# Patient Record
Sex: FEMALE | Race: WHITE | NOT HISPANIC OR LATINO | Employment: OTHER | URBAN - METROPOLITAN AREA
[De-identification: names, ages, dates, MRNs, and addresses within clinical notes are randomized per-mention and may not be internally consistent; named-entity substitution may affect disease eponyms.]

---

## 2019-05-03 ENCOUNTER — PREP FOR PROCEDURE (OUTPATIENT)
Dept: SCHEDULING | Facility: MEDICAL CENTER | Age: 44
End: 2019-05-03

## 2019-05-03 PROBLEM — K42.0 UMBILICAL HERNIA WITH OBSTRUCTION BUT NO GANGRENE: Status: ACTIVE | Noted: 2019-05-03

## 2019-06-13 ENCOUNTER — HOSPITAL ENCOUNTER (OUTPATIENT)
Dept: RADIOLOGY | Facility: MEDICAL CENTER | Age: 44
End: 2019-06-13
Attending: OBSTETRICS & GYNECOLOGY | Admitting: SURGERY
Payer: COMMERCIAL

## 2019-06-13 DIAGNOSIS — Z01.812 PRE-OPERATIVE LABORATORY EXAMINATION: ICD-10-CM

## 2019-06-13 DIAGNOSIS — Z01.811 PRE-OPERATIVE RESPIRATORY EXAMINATION: ICD-10-CM

## 2019-06-13 LAB
ANION GAP SERPL CALC-SCNC: 7 MMOL/L (ref 0–11.9)
BASOPHILS # BLD AUTO: 0.4 % (ref 0–1.8)
BASOPHILS # BLD: 0.02 K/UL (ref 0–0.12)
BUN SERPL-MCNC: 16 MG/DL (ref 8–22)
CALCIUM SERPL-MCNC: 9.4 MG/DL (ref 8.5–10.5)
CHLORIDE SERPL-SCNC: 107 MMOL/L (ref 96–112)
CO2 SERPL-SCNC: 23 MMOL/L (ref 20–33)
CREAT SERPL-MCNC: 1.1 MG/DL (ref 0.5–1.4)
EOSINOPHIL # BLD AUTO: 0.07 K/UL (ref 0–0.51)
EOSINOPHIL NFR BLD: 1.4 % (ref 0–6.9)
ERYTHROCYTE [DISTWIDTH] IN BLOOD BY AUTOMATED COUNT: 42.5 FL (ref 35.9–50)
GLUCOSE SERPL-MCNC: 105 MG/DL (ref 65–99)
HCT VFR BLD AUTO: 44.5 % (ref 37–47)
HGB BLD-MCNC: 14.6 G/DL (ref 12–16)
IMM GRANULOCYTES # BLD AUTO: 0.01 K/UL (ref 0–0.11)
IMM GRANULOCYTES NFR BLD AUTO: 0.2 % (ref 0–0.9)
LYMPHOCYTES # BLD AUTO: 1.36 K/UL (ref 1–4.8)
LYMPHOCYTES NFR BLD: 26.9 % (ref 22–41)
MCH RBC QN AUTO: 31.7 PG (ref 27–33)
MCHC RBC AUTO-ENTMCNC: 32.8 G/DL (ref 33.6–35)
MCV RBC AUTO: 96.5 FL (ref 81.4–97.8)
MONOCYTES # BLD AUTO: 0.28 K/UL (ref 0–0.85)
MONOCYTES NFR BLD AUTO: 5.5 % (ref 0–13.4)
NEUTROPHILS # BLD AUTO: 3.32 K/UL (ref 2–7.15)
NEUTROPHILS NFR BLD: 65.6 % (ref 44–72)
NRBC # BLD AUTO: 0 K/UL
NRBC BLD-RTO: 0 /100 WBC
PLATELET # BLD AUTO: 206 K/UL (ref 164–446)
PMV BLD AUTO: 10.6 FL (ref 9–12.9)
POTASSIUM SERPL-SCNC: 4.5 MMOL/L (ref 3.6–5.5)
RBC # BLD AUTO: 4.61 M/UL (ref 4.2–5.4)
SODIUM SERPL-SCNC: 137 MMOL/L (ref 135–145)
WBC # BLD AUTO: 5.1 K/UL (ref 4.8–10.8)

## 2019-06-13 PROCEDURE — 85025 COMPLETE CBC W/AUTO DIFF WBC: CPT

## 2019-06-13 PROCEDURE — 36415 COLL VENOUS BLD VENIPUNCTURE: CPT

## 2019-06-13 PROCEDURE — 71046 X-RAY EXAM CHEST 2 VIEWS: CPT

## 2019-06-13 PROCEDURE — 80048 BASIC METABOLIC PNL TOTAL CA: CPT

## 2019-06-13 RX ORDER — IBUPROFEN 200 MG
600-800 TABLET ORAL EVERY 6 HOURS PRN
COMMUNITY

## 2019-06-20 ENCOUNTER — ANESTHESIA EVENT (OUTPATIENT)
Dept: SURGERY | Facility: MEDICAL CENTER | Age: 44
End: 2019-06-20
Payer: COMMERCIAL

## 2019-06-20 ENCOUNTER — HOSPITAL ENCOUNTER (OUTPATIENT)
Facility: MEDICAL CENTER | Age: 44
End: 2019-06-20
Attending: SURGERY | Admitting: SURGERY
Payer: COMMERCIAL

## 2019-06-20 ENCOUNTER — ANESTHESIA (OUTPATIENT)
Dept: SURGERY | Facility: MEDICAL CENTER | Age: 44
End: 2019-06-20
Payer: COMMERCIAL

## 2019-06-20 VITALS
BODY MASS INDEX: 24.73 KG/M2 | TEMPERATURE: 98.1 F | DIASTOLIC BLOOD PRESSURE: 64 MMHG | WEIGHT: 144.84 LBS | RESPIRATION RATE: 16 BRPM | OXYGEN SATURATION: 98 % | HEIGHT: 64 IN | HEART RATE: 52 BPM | SYSTOLIC BLOOD PRESSURE: 103 MMHG

## 2019-06-20 DIAGNOSIS — K42.0 UMBILICAL HERNIA WITH OBSTRUCTION BUT NO GANGRENE: Primary | ICD-10-CM

## 2019-06-20 PROCEDURE — A6402 STERILE GAUZE <= 16 SQ IN: HCPCS | Performed by: SURGERY

## 2019-06-20 PROCEDURE — C1781 MESH (IMPLANTABLE): HCPCS | Performed by: SURGERY

## 2019-06-20 PROCEDURE — 501586 HCHG TROCAR, THRD SPIKE 5X55: Performed by: SURGERY

## 2019-06-20 PROCEDURE — 501445 HCHG STAPLER, SKIN DISP: Performed by: SURGERY

## 2019-06-20 PROCEDURE — 501838 HCHG SUTURE GENERAL: Performed by: SURGERY

## 2019-06-20 PROCEDURE — 700105 HCHG RX REV CODE 258: Performed by: SURGERY

## 2019-06-20 PROCEDURE — 500886 HCHG PACK, LAPAROSCOPY: Performed by: SURGERY

## 2019-06-20 PROCEDURE — 700101 HCHG RX REV CODE 250: Performed by: ANESTHESIOLOGY

## 2019-06-20 PROCEDURE — 160036 HCHG PACU - EA ADDL 30 MINS PHASE I: Performed by: SURGERY

## 2019-06-20 PROCEDURE — A9270 NON-COVERED ITEM OR SERVICE: HCPCS | Performed by: ANESTHESIOLOGY

## 2019-06-20 PROCEDURE — 500512 HCHG ENDO PEANUT: Performed by: SURGERY

## 2019-06-20 PROCEDURE — 700111 HCHG RX REV CODE 636 W/ 250 OVERRIDE (IP): Performed by: ANESTHESIOLOGY

## 2019-06-20 PROCEDURE — 160035 HCHG PACU - 1ST 60 MINS PHASE I: Performed by: SURGERY

## 2019-06-20 PROCEDURE — 160029 HCHG SURGERY MINUTES - 1ST 30 MINS LEVEL 4: Performed by: SURGERY

## 2019-06-20 PROCEDURE — 501574 HCHG TROCAR, SMTH CAN&SEAL 5: Performed by: SURGERY

## 2019-06-20 PROCEDURE — 160048 HCHG OR STATISTICAL LEVEL 1-5: Performed by: SURGERY

## 2019-06-20 PROCEDURE — A4338 INDWELLING CATHETER LATEX: HCPCS | Performed by: SURGERY

## 2019-06-20 PROCEDURE — 160002 HCHG RECOVERY MINUTES (STAT): Performed by: SURGERY

## 2019-06-20 PROCEDURE — 160041 HCHG SURGERY MINUTES - EA ADDL 1 MIN LEVEL 4: Performed by: SURGERY

## 2019-06-20 PROCEDURE — 160009 HCHG ANES TIME/MIN: Performed by: SURGERY

## 2019-06-20 PROCEDURE — 700102 HCHG RX REV CODE 250 W/ 637 OVERRIDE(OP): Performed by: ANESTHESIOLOGY

## 2019-06-20 PROCEDURE — 500002 HCHG ADHESIVE, DERMABOND: Performed by: SURGERY

## 2019-06-20 DEVICE — MESH VENTRALEX ST W/STRAP - 4.3CM SMALL (1EA/CA): Type: IMPLANTABLE DEVICE | Site: UMBILICAL | Status: FUNCTIONAL

## 2019-06-20 RX ORDER — DIPHENHYDRAMINE HYDROCHLORIDE 50 MG/ML
12.5 INJECTION INTRAMUSCULAR; INTRAVENOUS
Status: DISCONTINUED | OUTPATIENT
Start: 2019-06-20 | End: 2019-06-20 | Stop reason: HOSPADM

## 2019-06-20 RX ORDER — MEPERIDINE HYDROCHLORIDE 25 MG/ML
6.25 INJECTION INTRAMUSCULAR; INTRAVENOUS; SUBCUTANEOUS
Status: DISCONTINUED | OUTPATIENT
Start: 2019-06-20 | End: 2019-06-20 | Stop reason: HOSPADM

## 2019-06-20 RX ORDER — BUPIVACAINE HYDROCHLORIDE 2.5 MG/ML
INJECTION, SOLUTION EPIDURAL; INFILTRATION; INTRACAUDAL PRN
Status: DISCONTINUED | OUTPATIENT
Start: 2019-06-20 | End: 2019-06-20 | Stop reason: SURG

## 2019-06-20 RX ORDER — HYDROMORPHONE HYDROCHLORIDE 1 MG/ML
0.4 INJECTION, SOLUTION INTRAMUSCULAR; INTRAVENOUS; SUBCUTANEOUS
Status: DISCONTINUED | OUTPATIENT
Start: 2019-06-20 | End: 2019-06-20 | Stop reason: HOSPADM

## 2019-06-20 RX ORDER — ACETAMINOPHEN 500 MG
1000 TABLET ORAL ONCE
Status: COMPLETED | OUTPATIENT
Start: 2019-06-20 | End: 2019-06-20

## 2019-06-20 RX ORDER — HALOPERIDOL 5 MG/ML
1 INJECTION INTRAMUSCULAR
Status: DISCONTINUED | OUTPATIENT
Start: 2019-06-20 | End: 2019-06-20 | Stop reason: HOSPADM

## 2019-06-20 RX ORDER — BUPIVACAINE HYDROCHLORIDE AND EPINEPHRINE 5; 5 MG/ML; UG/ML
INJECTION, SOLUTION EPIDURAL; INTRACAUDAL; PERINEURAL
Status: DISCONTINUED
Start: 2019-06-20 | End: 2019-06-20 | Stop reason: HOSPADM

## 2019-06-20 RX ORDER — SCOLOPAMINE TRANSDERMAL SYSTEM 1 MG/1
PATCH, EXTENDED RELEASE TRANSDERMAL
Status: DISCONTINUED
Start: 2019-06-20 | End: 2019-06-20 | Stop reason: HOSPADM

## 2019-06-20 RX ORDER — MAGNESIUM SULFATE HEPTAHYDRATE 40 MG/ML
INJECTION, SOLUTION INTRAVENOUS PRN
Status: DISCONTINUED | OUTPATIENT
Start: 2019-06-20 | End: 2019-06-20 | Stop reason: SURG

## 2019-06-20 RX ORDER — CELECOXIB 200 MG/1
400 CAPSULE ORAL ONCE
Status: COMPLETED | OUTPATIENT
Start: 2019-06-20 | End: 2019-06-20

## 2019-06-20 RX ORDER — HYDROMORPHONE HYDROCHLORIDE 1 MG/ML
0.1 INJECTION, SOLUTION INTRAMUSCULAR; INTRAVENOUS; SUBCUTANEOUS
Status: DISCONTINUED | OUTPATIENT
Start: 2019-06-20 | End: 2019-06-20 | Stop reason: HOSPADM

## 2019-06-20 RX ORDER — ONDANSETRON 2 MG/ML
4 INJECTION INTRAMUSCULAR; INTRAVENOUS
Status: COMPLETED | OUTPATIENT
Start: 2019-06-20 | End: 2019-06-20

## 2019-06-20 RX ORDER — SODIUM CHLORIDE, SODIUM LACTATE, POTASSIUM CHLORIDE, AND CALCIUM CHLORIDE .6; .31; .03; .02 G/100ML; G/100ML; G/100ML; G/100ML
500 INJECTION, SOLUTION INTRAVENOUS ONCE
Status: DISCONTINUED | OUTPATIENT
Start: 2019-06-20 | End: 2019-06-20 | Stop reason: HOSPADM

## 2019-06-20 RX ORDER — HYDRALAZINE HYDROCHLORIDE 20 MG/ML
5 INJECTION INTRAMUSCULAR; INTRAVENOUS
Status: DISCONTINUED | OUTPATIENT
Start: 2019-06-20 | End: 2019-06-20 | Stop reason: HOSPADM

## 2019-06-20 RX ORDER — BUPIVACAINE HYDROCHLORIDE AND EPINEPHRINE 5; 5 MG/ML; UG/ML
INJECTION, SOLUTION EPIDURAL; INTRACAUDAL; PERINEURAL
Status: DISCONTINUED | OUTPATIENT
Start: 2019-06-20 | End: 2019-06-20 | Stop reason: HOSPADM

## 2019-06-20 RX ORDER — OXYCODONE HYDROCHLORIDE AND ACETAMINOPHEN 5; 325 MG/1; MG/1
1-2 TABLET ORAL EVERY 6 HOURS PRN
Qty: 20 TAB | Refills: 0 | Status: SHIPPED | OUTPATIENT
Start: 2019-06-20 | End: 2019-06-25

## 2019-06-20 RX ORDER — OXYCODONE HCL 5 MG/5 ML
10 SOLUTION, ORAL ORAL
Status: COMPLETED | OUTPATIENT
Start: 2019-06-20 | End: 2019-06-20

## 2019-06-20 RX ORDER — GABAPENTIN 300 MG/1
300 CAPSULE ORAL ONCE
Status: COMPLETED | OUTPATIENT
Start: 2019-06-20 | End: 2019-06-20

## 2019-06-20 RX ORDER — OXYCODONE HCL 5 MG/5 ML
5 SOLUTION, ORAL ORAL
Status: COMPLETED | OUTPATIENT
Start: 2019-06-20 | End: 2019-06-20

## 2019-06-20 RX ORDER — CEFAZOLIN SODIUM 1 G/3ML
INJECTION, POWDER, FOR SOLUTION INTRAMUSCULAR; INTRAVENOUS PRN
Status: DISCONTINUED | OUTPATIENT
Start: 2019-06-20 | End: 2019-06-20 | Stop reason: SURG

## 2019-06-20 RX ORDER — HYDROMORPHONE HYDROCHLORIDE 1 MG/ML
0.2 INJECTION, SOLUTION INTRAMUSCULAR; INTRAVENOUS; SUBCUTANEOUS
Status: DISCONTINUED | OUTPATIENT
Start: 2019-06-20 | End: 2019-06-20 | Stop reason: HOSPADM

## 2019-06-20 RX ORDER — LABETALOL HYDROCHLORIDE 5 MG/ML
5 INJECTION, SOLUTION INTRAVENOUS
Status: DISCONTINUED | OUTPATIENT
Start: 2019-06-20 | End: 2019-06-20 | Stop reason: HOSPADM

## 2019-06-20 RX ORDER — BUPIVACAINE HYDROCHLORIDE AND EPINEPHRINE 2.5; 5 MG/ML; UG/ML
INJECTION, SOLUTION EPIDURAL; INFILTRATION; INTRACAUDAL; PERINEURAL
Status: DISCONTINUED
Start: 2019-06-20 | End: 2019-06-20 | Stop reason: HOSPADM

## 2019-06-20 RX ORDER — SODIUM CHLORIDE, SODIUM LACTATE, POTASSIUM CHLORIDE, CALCIUM CHLORIDE 600; 310; 30; 20 MG/100ML; MG/100ML; MG/100ML; MG/100ML
INJECTION, SOLUTION INTRAVENOUS CONTINUOUS
Status: DISCONTINUED | OUTPATIENT
Start: 2019-06-20 | End: 2019-06-20 | Stop reason: HOSPADM

## 2019-06-20 RX ORDER — DEXAMETHASONE SODIUM PHOSPHATE 4 MG/ML
INJECTION, SOLUTION INTRA-ARTICULAR; INTRALESIONAL; INTRAMUSCULAR; INTRAVENOUS; SOFT TISSUE PRN
Status: DISCONTINUED | OUTPATIENT
Start: 2019-06-20 | End: 2019-06-20 | Stop reason: SURG

## 2019-06-20 RX ADMIN — FENTANYL CITRATE 50 MCG: 50 INJECTION INTRAMUSCULAR; INTRAVENOUS at 10:47

## 2019-06-20 RX ADMIN — GABAPENTIN 300 MG: 300 CAPSULE ORAL at 06:41

## 2019-06-20 RX ADMIN — LIDOCAINE HYDROCHLORIDE 40 MG: 20 INJECTION, SOLUTION INFILTRATION; PERINEURAL at 07:35

## 2019-06-20 RX ADMIN — ROCURONIUM BROMIDE 100 MG: 10 INJECTION, SOLUTION INTRAVENOUS at 07:35

## 2019-06-20 RX ADMIN — DEXAMETHASONE SODIUM PHOSPHATE 8 MG: 4 INJECTION, SOLUTION INTRA-ARTICULAR; INTRALESIONAL; INTRAMUSCULAR; INTRAVENOUS; SOFT TISSUE at 07:43

## 2019-06-20 RX ADMIN — ACETAMINOPHEN 1000 MG: 500 TABLET ORAL at 06:41

## 2019-06-20 RX ADMIN — CEFAZOLIN 2 G: 330 INJECTION, POWDER, FOR SOLUTION INTRAMUSCULAR; INTRAVENOUS at 07:43

## 2019-06-20 RX ADMIN — SODIUM CHLORIDE, POTASSIUM CHLORIDE, SODIUM LACTATE AND CALCIUM CHLORIDE: 600; 310; 30; 20 INJECTION, SOLUTION INTRAVENOUS at 07:30

## 2019-06-20 RX ADMIN — OXYCODONE HYDROCHLORIDE 10 MG: 5 SOLUTION ORAL at 10:28

## 2019-06-20 RX ADMIN — FENTANYL CITRATE 100 MCG: 50 INJECTION, SOLUTION INTRAMUSCULAR; INTRAVENOUS at 07:35

## 2019-06-20 RX ADMIN — PROPOFOL 100 MG: 10 INJECTION, EMULSION INTRAVENOUS at 07:35

## 2019-06-20 RX ADMIN — ONDANSETRON 4 MG: 2 INJECTION INTRAMUSCULAR; INTRAVENOUS at 12:39

## 2019-06-20 RX ADMIN — CELECOXIB 400 MG: 200 CAPSULE ORAL at 06:41

## 2019-06-20 RX ADMIN — MEPERIDINE HYDROCHLORIDE 6.25 MG: 25 INJECTION INTRAMUSCULAR; INTRAVENOUS; SUBCUTANEOUS at 10:41

## 2019-06-20 RX ADMIN — FENTANYL CITRATE 50 MCG: 50 INJECTION, SOLUTION INTRAMUSCULAR; INTRAVENOUS at 10:05

## 2019-06-20 RX ADMIN — FENTANYL CITRATE 50 MCG: 50 INJECTION, SOLUTION INTRAMUSCULAR; INTRAVENOUS at 09:45

## 2019-06-20 RX ADMIN — FENTANYL CITRATE 50 MCG: 50 INJECTION, SOLUTION INTRAMUSCULAR; INTRAVENOUS at 08:10

## 2019-06-20 RX ADMIN — SODIUM CHLORIDE, POTASSIUM CHLORIDE, SODIUM LACTATE AND CALCIUM CHLORIDE: 600; 310; 30; 20 INJECTION, SOLUTION INTRAVENOUS at 06:37

## 2019-06-20 RX ADMIN — MAGNESIUM SULFATE IN WATER 2 G: 40 INJECTION, SOLUTION INTRAVENOUS at 07:43

## 2019-06-20 RX ADMIN — MIDAZOLAM HYDROCHLORIDE 2 MG: 1 INJECTION, SOLUTION INTRAMUSCULAR; INTRAVENOUS at 07:35

## 2019-06-20 RX ADMIN — MEPERIDINE HYDROCHLORIDE 6.25 MG: 25 INJECTION INTRAMUSCULAR; INTRAVENOUS; SUBCUTANEOUS at 10:28

## 2019-06-20 RX ADMIN — FENTANYL CITRATE 25 MCG: 50 INJECTION, SOLUTION INTRAMUSCULAR; INTRAVENOUS at 11:45

## 2019-06-20 RX ADMIN — BUPIVACAINE HYDROCHLORIDE 40 ML: 2.5 INJECTION, SOLUTION EPIDURAL; INFILTRATION; INTRACAUDAL; PERINEURAL at 07:38

## 2019-06-20 ASSESSMENT — PAIN SCALES - GENERAL: PAIN_LEVEL: 0

## 2019-06-20 NOTE — OP REPORT
DATE OF SERVICE:  06/20/2019    PREOPERATIVE DIAGNOSES:  1.  Supraumbilical hernia.  2.  Chronic pelvic pain.  3.  Right upper quadrant pain.    POSTOPERATIVE DIAGNOSES:  1.  Supraumbilical hernia.  2.  Small right spigelian hernia; sliding hernia containing colon.    PROCEDURES:  1.  Diagnostic laparoscopy.  2.  Pelviscopy, lysis of adhesions.  3.  Primary repair of right-sided spigelian hernia.  4.  Herniorrhaphy of supraumbilical hernia with Ventrio patch.    SURGEON:  Orin Chopra MD and Saranya Schrader MD    ANESTHESIOLOGIST:  Deysi Horan MD    ANESTHESIA:  GETA.    FINDINGS:  A supraumbilical hernia with 2 small adjacent defects together   equaling approximately 1.7 cm.  Supraumbilical hernia containing a large   amount of preperitoneal incarcerated fat.  Tiny right lower quadrant spigelian   hernia containing the side of the ascending colon.  Extensive intra-abdominal   adhesions.  No sign of endometriosis or involvement of right-sided ovary.    COMPLICATIONS:  None.    INDICATIONS:  The patient is a 44-year-old female.  She has a complicated   history of gynecologic surgery for cervical CA.  The patient underwent total   abdominal hysterectomy and the postoperative course was complicated with   severe intra-abdominal sepsis secondary to abscesses.  The patient had   exploratory surgery few months out from her initial hysterectomy, at that   time, was found to have intra-abdominal abscesses, severe adhesions in her   pelvis and involvement of the appendix, which was removed according to the   operative report.  The patient presented to my office complaining of a   supraumbilical hernia causing significant pain, also right lower quadrant   pain.  The patient had history of pexy of her right ovary out of her pelvis   and the anticipation that she would have external beam radiation, which she   did not have.  Differential was that the pain was caused by her right-sided   ovary versus adhesions.  The  patient also reported intermittent right upper   quadrant pain.  Decision was made to take her to surgery, combined procedure   with gynecology, Dr. Schrader for pelviscopy, possible lysis of adhesions or   removal of ovary also to evaluate for endometrial disease.  Decision was made   to perform an umbilical repair during the surgery.  Preoperatively, we   discussed with the patient that should the appendiceal stump be easily located   and this will be removed, but this would also preclude synthetic mesh   placement.  Preoperatively, we had arranged for a Synecor hybrid mesh in the   event that should happen, but this was not readily available during the case.    Decision was made to forego any appendectomy and due to the fact that we were   anticipating having to place synthetic mesh in the supraumbilical repair.    PROCEDURE IN DETAIL:  The patient was consented preoperatively, taken to the   operating room and placed in the supine position.  Anesthesia was induced and   she was endotracheally intubated without difficulty.  She was then carefully   placed in a low lithotomy position.  Plain block was performed by Dr. Horan.    Please see documentation of that portion of procedure.  Pelvic exam was then   performed by Dr. Schrader.  Please see her dictation for her portion of the   procedure as well.  Linda catheter was placed.  The abdomen was then prepped   and draped.  A 4 cm vertical midline incision was made superior to the   umbilicus just overlying the palpable hernia.  This was done after   anesthetizing all the incision sites with total of 20 mL of 0.5% lidocaine   with epinephrine.  The incision was deepened down through the dermis,   subcutaneous fat with electrocautery exposing large amount of incarcerated   preperitoneal fat.  This was coming through 2 small adjacent supraumbilical   defects that were connected by incising the fascia.  The preperitoneal fat was   then removed.  Stay sutures were placed  and the Nicholas was placed through the   defect and the abdomen was insufflated.  Upon inspection of the abdomen, the   patient has light adhesions throughout the abdomen.  Significantly, she had   adhesions in the right upper quadrant.  This was an area of concern given her   history of pain there, the adhesions were taken down bluntly and sharply with   care, they involved colon to the side wall and the liver.  The right lower   quadrant was then explored.  The ovary that had been pexed in that site was   not visualized, but the patient had knuckle of ascending colon that was   adherent to the right lower quadrant.  Upon careful dissection, the loop of   colon was found to be embedded in the abdominal wall in this area.  Decision   was made to perform a counter incision, 2 cm transverse incision overlying   this bowel loop, which was then able to be carefully taken down through this   counter incision.  Once the ascending colon was taken down, it was inspected.    There was a light serosal disruption that was oversewed with 3-0 silks using   Lembert sutures.  The bowel was then placed back in the abdomen and the   patient was found to have a spigelian hernia in this region that the colon had   herniated through.  This defect totaled less than 2 cm.  It was closed down   in 2 layers with 0 Vicryl suture a 0.5 cm apart with little to no tension on   this closure.  The subcutaneous tissue in this area was closed with   interrupted 3-0 Vicryl, the skin closed with subcuticular stitches and 4-0   Monocryl.  The abdomen was then reinsufflated and the remainder of the   adhesions in the right lower quadrant were then swept down.  The pelvis was   inspected.  There was no sign of endometrial disease or adhesions seemed to be   a source of pain in this area.  The patient had ____ adhesions in the left   lower quadrant, in midline, these were left alone.  The abdomen was then   inspected.  Hemostasis was confirmed.  The  abdomen and pelvis were irrigated   out copiously and hemostasis was confirmed once again.  The bowel was then   inspected.  It was all atraumatic upon inspection.  The abdomen was then   desufflated to repair the supraumbilical hernia, which the defect totaled   approximately 1.5-1.7 cm.  This was repaired with a 4.3 Ventrio patch placed   intraperitoneally.  It was secured to the overlying fascia at 8 points with   2-0 PDS suture.  The defect central to the mesh with overlap of 1 cm all   around.  The fascia overlying the mesh was then closed with little to no   tension on the closure.  This was closed with interrupted 0 PDS sutures.  The   Lisbeth's fascia was closed with interrupted 3-0 Vicryl and skin closed with   subcuticular stitches of 4-0 Monocryl.  Steri-Strips and Tegaderm dressings   were then applied.  All lap, sponge and instrument counts were correct at the   end of the case x2.  The patient tolerated the procedure well.  She was   awakened from anesthesia, extubated, and taken to the postanesthesia care unit   in good condition.       ____________________________________     MD MONSE Viveros / ANAHI    DD:  06/20/2019 10:21:48  DT:  06/20/2019 10:48:56    D#:  [id number]  Job#:  [id number]

## 2019-06-20 NOTE — ANESTHESIA TIME REPORT
Anesthesia Start and Stop Event Times     Date Time Event    6/20/2019 0730 Anesthesia Start     1020 Anesthesia Stop        Responsible Staff  06/20/19    Name Role Begin End    Deysi Horan M.D. Anesth 0730 1020        Preop Diagnosis (Free Text):  Pre-op Diagnosis     Umbilical hernia with obstruction but no gangrene          Preop Diagnosis (Codes):  Diagnosis Information     Diagnosis Code(s): Umbilical hernia with obstruction but no gangrene [K42.0]        Post op Diagnosis  Umbilical hernia      Premium Reason  Non-Premium    Comments:

## 2019-06-20 NOTE — OR NURSING
1013 received from the OR, report from Dr Hutton, s/p umbilical hernia repair, asleep, no OA, breathing unlabored & clear, shivering, pt c/o being cold, warmer on, abd soft lap stabs x 4, tegederm with gauze, denies pain,  1030 more awake, c/o pain, meds given  1100 Shivering resolved cont to c/o pain, meds given  1230 oob/br & vd'd, no vag bleeding noted, ambulatory & steady on feet, returned to recliner  1305 pt expressing readiness to go home & that she has had very good care today, instructions given, iv d/c'd, home via w/c

## 2019-06-20 NOTE — ANESTHESIA PREPROCEDURE EVALUATION
44F o/w healthy.  Pt denies CP/SOB>4mets.  Denies CAD, CHF, CVA, CKD, DM2, bleeding/clotting d/o.  Denies smoking or RAD.  Denies FHX or PHx of anesthesia cxs.  Endorses PONV after hysterectomy.  Denies symptomatic GERD.  Discussed risks/benefits GA. Questions answered.      Relevant Problems   No relevant active problems       Physical Exam    Airway   Mallampati: I  TM distance: >3 FB  Neck ROM: full       Cardiovascular - normal exam  Rhythm: regular  Rate: normal  (-) murmur     Dental - normal exam         Pulmonary - normal exam  Breath sounds clear to auscultation     Abdominal    Neurological - normal exam                 Anesthesia Plan    ASA 1       Plan - general             Induction: intravenous    Postoperative Plan: Postoperative administration of opioids is intended.    Pertinent diagnostic labs and testing reviewed    Informed Consent:    Anesthetic plan and risks discussed with patient.    Use of blood products discussed with: patient whom consented to blood products.

## 2019-06-20 NOTE — OR SURGEON
Immediate Post OP Note    PreOp Diagnosis: Supra-umbilical Hernia    PostOp Diagnosis: Supra Umbilical Hernia, Small R. Spigelian hernia; sliding    Procedure(s):  REPAIR, HERNIA, UMBILICAL  and spagalien hernia- W/MESH - Wound Class: Clean  PELVISCOPY - W/ ADHESIOLYSIS - Wound Class: Clean    Surgeon(s):  KIYA Leon M.D.    Anesthesiologist/Type of Anesthesia:  Anesthesiologist: Deysi Horan M.D./General    Surgical Staff:  Circulator: John Preston R.N.  Relief Circulator: Ekaterina Collado R.N.  Relief Scrub: Al Valverde  Scrub Person: Gilbert Burroughs; Adair Melgoza    Specimens removed if any:  none    Estimated Blood Loss: 20cc    Findings: Supra umbilical Hernia; two small adjacent defects containing pre peritoneal fat  Tiny RLQ spigelian hernia containing side of colon wall    Complications: none        6/20/2019 10:10 AM Orin Chopra M.D.

## 2019-06-20 NOTE — ANESTHESIA PROCEDURE NOTES
Airway  Date/Time: 6/20/2019 7:36 AM  Performed by: ROSA LLANES  Authorized by: ROSA LLANES     Location:  OR  Urgency:  Elective  Difficult Airway: No    Indications for Airway Management:  Anesthesia  Spontaneous Ventilation: absent    Sedation Level:  Deep  Preoxygenated: Yes    Patient Position:  Sniffing  Final Airway Type:  Endotracheal airway  Final Endotracheal Airway:  ETT  Cuffed: Yes    Technique Used for Successful ETT Placement:  Direct laryngoscopy  Insertion Site:  Oral  Blade Type:  Lali  Laryngoscope Blade/Videolaryngoscope Blade Size:  3  ETT Size (mm):  6.5  Measured from:  Teeth  ETT to Teeth (cm):  21  Placement Verified by: auscultation and capnometry    Cormack-Lehane Classification:  Grade I - full view of glottis  Number of Attempts at Approach:  1   atraumatic

## 2019-06-20 NOTE — ANESTHESIA POSTPROCEDURE EVALUATION
Patient: Jaquelin Rendon    Procedure Summary     Date:  06/20/19 Room / Location:  [facility] ROOM 25 / SURGERY SAME DAY [facility]    Anesthesia Start:  0730 Anesthesia Stop:  1020    Procedures:       REPAIR, HERNIA, UMBILICAL  and spagalien hernia- W/MESH (N/A Abdomen)      PELVISCOPY - W/ ADHESIOLYSIS (N/A Abdomen) Diagnosis:       Umbilical hernia with obstruction but no gangrene      (Umbilical hernia with obstruction but no gangrene  spiegalian hernia)    Surgeon:  Orin Chopra M.D.; Saranya Schrader M.D. Responsible Provider:  Deysi Horan M.D.    Anesthesia Type:  general ASA Status:  1          Final Anesthesia Type: general  Last vitals  BP   Blood Pressure: 103/64    Temp   36.7 °C (98.1 °F)    Pulse   Pulse: (!) 56, Heart Rate (Monitored): 64   Resp   16    SpO2   100 %      Anesthesia Post Evaluation    Patient location during evaluation: PACU  Patient participation: complete - patient participated  Level of consciousness: awake and alert  Pain score: 0    Airway patency: patent  Anesthetic complications: no  Cardiovascular status: hemodynamically stable  Respiratory status: acceptable  Hydration status: euvolemic    PONV: none           Nurse Pain Score: 0 (NPRS)

## 2019-06-20 NOTE — ANESTHESIA PROCEDURE NOTES
Peripheral Block  Performed by: ROSA LLANES  Authorized by: ROSA LLANES     Start Time:  6/20/2019 7:38 AM  End Time:  6/20/2019 7:41 AM  Reason for Block: at surgeon's request and post-op pain management    patient identified, IV checked, site marked, risks and benefits discussed, surgical consent, monitors and equipment checked, pre-op evaluation and timeout performed    Patient Position:  Supine  Prep: ChloraPrep    Monitoring:  Heart rate, continuous pulse ox and cardiac monitor  Block Region:  Trunk  Trunk - Block Type:  Rectus sheath plane block - TAP block    Laterality:  Bilateral  Procedures: ultrasound guided  Image captured, interpreted and electronically stored.  Local Infiltration:  Lidocaine  Strength:  1 %  Dose:  3 ml  Block Type:  Single-shot  Needle Length:  100mm  Needle Gauge:  21 G  Needle Localization:  Ultrasound guidance  Injection Assessment:  Negative aspiration for heme, no paresthesia on injection, incremental injection and local visualized surrounding nerve on ultrasound  Evidence of intravascular injection: No     Medial in plane approach with visualization via u/s of LA injected anterior to posterior rectus sheath BL. Total 40ml b1/3 plain (20ml each side). Negative aspiration q 5ml, no increased pressure on injection, no signs intravascular injection.

## 2019-06-20 NOTE — H&P
DATE OF OPERATION:  2019.    CHIEF COMPLAINT:  Pelvic pain, right side pain, below the rib cage, history of   hysterectomy and subsequent abscess with ruptured appendix, hernias having   them repaired by Dr. Chopra.    HISTORY OF PRESENT ILLNESS:  The patient is a 44-year-old , AB 2, status   post hysterectomy in  for cervical cancer in [place].  The patient had a   pelvic abscess and scar tissue with some leakage of the appendix.  This was   not seen on imaging, but was seen on a repeat surgery.  The patient continues   to have pain sometimes on the left, sometimes on the right in her pelvis and   then also on the right side under her ribcage, especially she takes a deep   breath, it can hurt cyclically, definitely gets worse during this time, but   never completely goes away however.  She denies pain with intercourse, but   says that she does sometimes hurt afterwards, especially if it is the   ovulation time.  The patient also has some hernias and has seen Dr. Chopra for   this and is scheduled for repair of these hernias at the same time that I will   look for a reason for her pelvic pain.  The patient did have a pelvic   ultrasound in 2018, which showed a 3.9 cm right ovarian cyst, which was   complex, likely hemorrhagic and the left ovary at that time appeared normal.    A followup CT scan was done on 2019 which showed no evidence of residual   or recurrent pelvic mass.  No free fluid in the pelvis.  The patient would   like to maintain her ovaries if possible, but would be interested in a   unilateral oophorectomy if it appears that this is the source of her pain.    She is scheduled for a pelviscopy with possible unilateral oophorectomy in   conjunction with Dr. Chopra's hernia repairs.  Risks, benefits, and   alternatives of the procedure were discussed with the patient in detail and   she agrees to proceed.    PAST MEDICAL HISTORY:  Cervical cancer, hemorrhoids, back trouble, history of    abnormal Pap smears.    PAST SURGICAL HISTORY:  Appendectomy in 2014 and had a hysterectomy in 2014.    MENSTRUAL HISTORY:  The patient underwent menarche at age 16 and menopause in   2014.    PAST OBSTETRICAL HISTORY:  She has had 4 pregnancies, 2 babies born live, 2   miscarriages, largest weighing 8 pounds 4 ounces.  Last delivery was in .    MEDICATIONS:  Supplements only.    FAMILY HISTORY:  Mother had thyroid cancer, otherwise noncontributory.    SOCIAL HISTORY:  The patient does not smoke, drinks 5 drinks a week.  Does not   do recreational drugs.    ALLERGIES:  No known drug allergies.    REVIEW OF SYSTEMS:  Noncontributory.    PHYSICAL EXAMINATION:  VITAL SIGNS:  The patient's blood pressure is 100/70.  Her weight is 148,   height is 5 feet 4-3/4 inches.  HEENT:  Within normal limits.  NECK:  Supple, without lymphadenopathy or thyromegaly.  CARDIOVASCULAR:  Regular rate and rhythm.  LUNGS:  Clear to auscultation.  BACK:  No CVA tenderness.  ABDOMEN:  Soft and nontender, nondistended.  No masses are palpable.  PELVIC:  EGBUS appears normal.  Vagina appears normal.  Cervix is surgically   absent.  Bimanual exam reveals no pelvic or abdominal masses.  EXTREMITIES:  Benign.    ASSESSMENT:  1.  A 44-year-old  4 para 2 abortions 2.  2.  Status post hysterectomy with ovarian conservation.  3.  History of cervical cancer.  4.  Complication of cuff abscess and leaking appendix postoperatively, so she   underwent another surgery and that only took her appendix out and said that   there was a lot of scar tissue.  5.  Pelvic pain and right rib cage pain cyclically, pain after intercourse   with ovulation, otherwise healthy.    PLAN:  The patient is scheduled for a pelviscopy with adhesiolysis and   possible unilateral oophorectomy in conjunction with Dr. Chopra's hernia   repair.  Risks, benefits, alternatives of the procedure were discussed with   the patient in detail and she agrees to proceed.   Preoperative labs will be   obtained.  Preoperative antibiotics were administered.  The patient was given   a prescription for Norco 5/325, #30 and Phenergan suppositories 25 mg #6.  If   she has any problems or questions, she can contact my office.       ____________________________________     MD EMILY Aden / ANAHI    DD:  06/19/2019 20:43:13  DT:  06/19/2019 23:36:48    D#:  [id number]  Job#:  [id number]

## 2019-06-20 NOTE — DISCHARGE INSTRUCTIONS
ACTIVITY: Rest and take it easy for the first 24 hours.  A responsible adult is recommended to remain with you during that time.  It is normal to feel sleepy.  We encourage you to not do anything that requires balance, judgment or coordination.    MILD FLU-LIKE SYMPTOMS ARE NORMAL. YOU MAY EXPERIENCE GENERALIZED MUSCLE ACHES, THROAT IRRITATION, HEADACHE AND/OR SOME NAUSEA.    FOR 24 HOURS DO NOT:  Drive, operate machinery or run household appliances.  Drink beer or alcoholic beverages.   Make important decisions or sign legal documents.    SPECIAL INSTRUCTIONS: Regular Diet     Ok To Shower, keep incisions as dry as possible, do not submerge.   Keep Plastic Dressings in place     Take over the counter stool softeners as needed for constipation     No strenuous activity of lifting >20 lbs for six weeks.   Follow up with Dr. Chopra in office in 10-14 days***    DIET: To avoid nausea, slowly advance diet as tolerated, avoiding spicy or greasy foods for the first day.  Add more substantial food to your diet according to your physician's instructions.  Babies can be fed formula or breast milk as soon as they are hungry.  INCREASE FLUIDS AND FIBER TO AVOID CONSTIPATION.        You should CALL YOUR PHYSICIAN if you develop:  Fever greater than 101 degrees F.  Pain not relieved by medication, or persistent nausea or vomiting.  Excessive bleeding (blood soaking through dressing) or unexpected drainage from the wound.  Extreme redness or swelling around the incision site, drainage of pus or foul smelling drainage.  Inability to urinate or empty your bladder within 8 hours.  Problems with breathing or chest pain.    You should call 911 if you develop problems with breathing or chest pain.  If you are unable to contact your doctor or surgical center, you should go to the nearest emergency room or urgent care center.  Physician's telephone #: *  Dr [phone]  Dr Purvis [phone]**    If any questions arise, call your doctor.   If your doctor is not available, please feel free to call the Surgical Center at [phone].  The Center is open Monday through Friday from 7AM to 7PM.  You can also call the HEALTH HOTLINE open 24 hours/day, 7 days/week and speak to a nurse at [phone], or toll free at [phone].    A registered nurse may call you a few days after your surgery to see how you are doing after your procedure.    MEDICATIONS: Resume taking daily medication.  Take prescribed pain medication with food.  If no medication is prescribed, you may take non-aspirin pain medication if needed.  PAIN MEDICATION CAN BE VERY CONSTIPATING.  Take a stool softener or laxative such as senokot, pericolace, or milk of magnesia if needed.    Prescription given for *family has**.  Last pain medication given at *10:30am next dose after 2:30pm**.    If your physician has prescribed pain medication that includes Acetaminophen (Tylenol), do not take additional Acetaminophen (Tylenol) while taking the prescribed medication.    Depression / Suicide Risk    As you are discharged from this [facility] facility, it is important to learn how to keep safe from harming yourself.    Recognize the warning signs:  · Abrupt changes in personality, positive or negative- including increase in energy   · Giving away possessions  · Change in eating patterns- significant weight changes-  positive or negative  · Change in sleeping patterns- unable to sleep or sleeping all the time   · Unwillingness or inability to communicate  · Depression  · Unusual sadness, discouragement and loneliness  · Talk of wanting to die  · Neglect of personal appearance   · Rebelliousness- reckless behavior  · Withdrawal from people/activities they love  · Confusion- inability to concentrate     If you or a loved one observes any of these behaviors or has concerns about self-harm, here's what you can do:  · Talk about it- your feelings and reasons for harming yourself  · Remove any  means that you might use to hurt yourself (examples: pills, rope, extension cords, firearm)  · Get professional help from the community (Mental Health, Substance Abuse, psychological counseling)  · Do not be alone:Call your Safe Contact- someone whom you trust who will be there for you.  · Call your local CRISIS HOTLINE 969-6811 or 572-827-3331  · Call your local Children's Mobile Crisis Response Team [place] (215) 241-7987 or www.LifeBio  · Call the toll free National Suicide Prevention Hotlines   · National Suicide Prevention Lifeline 184-977-DVSZ (9007)  · National Hope Line Network 800-SUICIDE (965-6713)

## 2019-07-20 NOTE — OP REPORT
DATE OF SERVICE:  06/20/2019    Operation in conjunction with Orin Chopra MD.    PREOPERATIVE DIAGNOSIS:  Chronic pelvic pain, right greater than left with   supraumbilical hernia and right upper quadrant pain.    POSTOPERATIVE DIAGNOSIS:  Chronic pelvic pain, right greater than left with   supraumbilical hernia and right upper quadrant pain with right spigelian   hernia containing colon.     Dr. Chopra did the bulk of the surgery.  While she was in there, we took a look   at the pelvis and both ovaries, which appeared normal.  So my part was   diagnostic pelviscopy and then I assisted Dr. Orin Chopra.    SURGEON:  Orin Chopra MD and Saranya Schrader MD     ANESTHESIOLOGIST:  eDysi Horan MD    ANESTHESIA:  General endotracheal.     My findings in the pelvis were normal ovaries bilaterally, normal adnexa   bilaterally and a surgically absent uterus.  No adhesions.  See Dr. Chopra's   finding under separate dictation.    COMPLICATIONS:  None.    TECHNIQUE:  The patient was taken to the operating room by Dr. Chopra and she   performed a primary repair of the right-sided spigelian hernia and   herniorrhaphy of the supraumbilical hernia with a Ventrio patch.  She also did   laparoscopy with lysis of adhesions, diagnostic laparoscopy.  I was present   to look for reason for the patient's chronic pelvic pain.  After Dr. Chopra   took down some adhesions, we were able to see into the pelvis and the pelvis   looked clear without adhesions and ovaries appeared normal.  I continued to   assist her with surgery and she finished and closed the incisions.  The   patient tolerated the procedure well and was brought to recovery room in   stable condition.       ____________________________________     Saranya Schrader MD    [facility] / [facility]    DD:  07/08/2019 18:03:56  DT:  07/08/2019 20:44:54    D#:  [id number]  Job#:  [id number]

## (undated) DEVICE — SENSOR SPO2 NEO LNCS ADHESIVE (20/BX) SEE USER NOTES

## (undated) DEVICE — TUBING SETDISPOS HIGH FLOW II - (10/BX)

## (undated) DEVICE — ENDO PEANUT 5MM DEVICE (12EA/BX)

## (undated) DEVICE — SUTURE 2-0 VICRYL PLUS SH - 8 X 18 INCH (12/BX)

## (undated) DEVICE — GLOVE BIOGEL SZ 7 SURGICAL PF LTX - (50PR/BX 4BX/CA)

## (undated) DEVICE — KIT ROOM DECONTAMINATION

## (undated) DEVICE — DERMABOND ADVANCED - (12EA/BX)

## (undated) DEVICE — SET SUCTION/IRRIGATION WITH DISPOSABLE TIP (6/CA )PART #0250-070-520 IS A SUB

## (undated) DEVICE — SUCTION INSTRUMENT YANKAUER BULBOUS TIP W/O VENT (50EA/CA)

## (undated) DEVICE — BLADE SURGICAL #15 - (50/BX 3BX/CA)

## (undated) DEVICE — SUTURE 0 VICRYL PLUS UR-6 - 27 INCH (36/BX)

## (undated) DEVICE — DRESSING TRANSPARENT FILM TEGADERM 4 X 4.75" (50EA/BX)"

## (undated) DEVICE — ELECTRODE DUAL RETURN W/ CORD - (50/PK)

## (undated) DEVICE — SUTURE 3-0 SILK SH C/R 18 IN - (12/BX)

## (undated) DEVICE — TROCAR 5X100 BLADED Z-THREAD - KII (6/BX)

## (undated) DEVICE — MASK ANESTHESIA ADULT  - (100/CA)

## (undated) DEVICE — TUBING CLEARLINK DUO-VENT - C-FLO (48EA/CA)

## (undated) DEVICE — SUTURE 3-0 VICRYL PLUS SH - 8X 18 INCH (12/BX)

## (undated) DEVICE — CHLORAPREP 26 ML APPLICATOR - ORANGE TINT(25/CA)

## (undated) DEVICE — HEAD HOLDER JUNIOR/ADULT

## (undated) DEVICE — STAPLER 45MM ARTICULATING - ENDO (3EA/BX)

## (undated) DEVICE — ARMREST CRADLE FOAM - (2PR/PK 12PR/CA)

## (undated) DEVICE — DRAPESURG STERI-DRAPE LONG - (10/BX 4BX/CA)

## (undated) DEVICE — GLOVE BIOGEL SZ 6.5 SURGICAL PF LTX (50PR/BX 4BX/CA)

## (undated) DEVICE — SYRINGE 10 ML CONTROL LL (25EA/BX 4BX/CA)

## (undated) DEVICE — PROTECTOR ULNA NERVE - (36PR/CA)

## (undated) DEVICE — SUTURE 2-0 COATED VICRYL PLUS - 12 X 18 INCH (12/BX)

## (undated) DEVICE — TRAY SRGPRP PVP IOD WT PRP - (20/CA)

## (undated) DEVICE — GLOVE SZ 6.5 BIOGEL PI MICRO - PF LF (50PR/BX)

## (undated) DEVICE — CANISTER SUCTION 3000ML MECHANICAL FILTER AUTO SHUTOFF MEDI-VAC NONSTERILE LF DISP  (40EA/CA)

## (undated) DEVICE — TRAY FOLEY CATHETER STATLOCK 16FR SURESTEP  (10EA/CA)

## (undated) DEVICE — SPONGE GAUZESTER. 2X2 4-PL - (2/PK 50PK/BX 30BX/CS)

## (undated) DEVICE — DRAPE LAPAROTOMY T SHEET - (12EA/CA)

## (undated) DEVICE — TOWELS CLOTH SURGICAL - (4/PK 20PK/CA)

## (undated) DEVICE — NEEDLE NON SAFETY HYPO 22 GA X 1 1/2 IN (100/BX)

## (undated) DEVICE — ANTI-FOG SOLUTION - 60BTL/CA

## (undated) DEVICE — SODIUM CHL IRRIGATION 0.9% 1000ML (12EA/CA)

## (undated) DEVICE — STAPLER SKIN DISP - (6/BX 10BX/CA) VISISTAT

## (undated) DEVICE — PACK LAPAROSCOPY - (1/CA)

## (undated) DEVICE — CANISTER SUCTION RIGID RED 1500CC (40EA/CA)

## (undated) DEVICE — SPONGE GAUZESTER 4 X 4 4PLY - (128PK/CA)

## (undated) DEVICE — GOWN SURGEONS LARGE - (32/CA)

## (undated) DEVICE — TROCAR LAPSCP 100MM 12MM NTHRD - (6/BX)

## (undated) DEVICE — SUTURE 4-0 MONOCRYL PLUS PS-2 - 27 INCH (36/BX)

## (undated) DEVICE — STERI STRIP COMPOUND BENZOIN - TINCTURE 0.6ML WITH APPLICATOR (40EA/BX)

## (undated) DEVICE — GLOVE BIOGEL INDICATOR SZ 7SURGICAL PF LTX - (50/BX 4BX/CA)

## (undated) DEVICE — GLOVE BIOGEL PI INDICATOR SZ 7.0 SURGICAL PF LF - (50/BX 4BX/CA)

## (undated) DEVICE — SUTURE 2-0 PDS II CT-2 - (36/BX)

## (undated) DEVICE — GOWN SURGEONS X-LARGE - DISP. (30/CA)

## (undated) DEVICE — GOWN WARMING STANDARD FLEX - (30/CA)

## (undated) DEVICE — MEDICINE CUP STERILE 2 OZ - (100/CA)

## (undated) DEVICE — DRESSING TRANSPARENT FILM TEGADERM 2.375 X 2.75"  (100EA/BX)"

## (undated) DEVICE — TROCARCANN&SEAL 5X55 ZTHREAD - 12/BX

## (undated) DEVICE — ELECTRODE 850 FOAM ADHESIVE - HYDROGEL RADIOTRNSPRNT (50/PK)

## (undated) DEVICE — TROCAR5X55 KII SHIELDED SYS - (6/BX)

## (undated) DEVICE — MANIFOLD NEPTUNE 1 PORT (20/PK)

## (undated) DEVICE — CATHETER IV 20 GA X 1-1/4 ---SURG.& SDS ONLY--- (50EA/BX)

## (undated) DEVICE — SET EXTENSION WITH 2 PORTS (48EA/CA) ***PART #2C8610 IS A SUBSTITUTE*****

## (undated) DEVICE — SET LEADWIRE 5 LEAD BEDSIDE DISPOSABLE ECG (1SET OF 5/EA)

## (undated) DEVICE — SUTURE 0 VICRYL PLUS CT-2 - 27 INCH (36/BX)

## (undated) DEVICE — KIT  I.V. START (100EA/CA)

## (undated) DEVICE — TUBE CONNECTING SUCTION - CLEAR PLASTIC STERILE 72 IN (50EA/CA)

## (undated) DEVICE — GLOVE BIOGEL PI INDICATOR SZ 6.5 SURGICAL PF LF - (50/BX 4BX/CA)

## (undated) DEVICE — SLEEVE, VASO, THIGH, MED

## (undated) DEVICE — KIT ANESTHESIA W/CIRCUIT & 3/LT BAG W/FILTER (20EA/CA)

## (undated) DEVICE — SUTURE GENERAL

## (undated) DEVICE — LACTATED RINGERS INJ 1000 ML - (14EA/CA 60CA/PF)